# Patient Record
Sex: FEMALE | Race: BLACK OR AFRICAN AMERICAN | NOT HISPANIC OR LATINO | Employment: FULL TIME | RURAL
[De-identification: names, ages, dates, MRNs, and addresses within clinical notes are randomized per-mention and may not be internally consistent; named-entity substitution may affect disease eponyms.]

---

## 2020-06-26 ENCOUNTER — HISTORICAL (OUTPATIENT)
Dept: ADMINISTRATIVE | Facility: HOSPITAL | Age: 45
End: 2020-06-26

## 2020-06-26 LAB — SARS-COV+SARS-COV-2 AG RESP QL IA.RAPID: NEGATIVE

## 2022-05-18 ENCOUNTER — OFFICE VISIT (OUTPATIENT)
Dept: FAMILY MEDICINE | Facility: CLINIC | Age: 47
End: 2022-05-18
Payer: COMMERCIAL

## 2022-05-18 VITALS
DIASTOLIC BLOOD PRESSURE: 73 MMHG | BODY MASS INDEX: 21.5 KG/M2 | OXYGEN SATURATION: 99 % | WEIGHT: 137 LBS | SYSTOLIC BLOOD PRESSURE: 108 MMHG | HEIGHT: 67 IN | HEART RATE: 86 BPM | TEMPERATURE: 99 F | RESPIRATION RATE: 20 BRPM

## 2022-05-18 DIAGNOSIS — U07.1 COVID-19: Primary | ICD-10-CM

## 2022-05-18 DIAGNOSIS — Z20.822 COVID-19 VIRUS TEST RESULT UNKNOWN: ICD-10-CM

## 2022-05-18 DIAGNOSIS — R05.9 COUGH: ICD-10-CM

## 2022-05-18 DIAGNOSIS — J02.9 SORE THROAT: ICD-10-CM

## 2022-05-18 DIAGNOSIS — R51.9 NONINTRACTABLE HEADACHE, UNSPECIFIED CHRONICITY PATTERN, UNSPECIFIED HEADACHE TYPE: ICD-10-CM

## 2022-05-18 DIAGNOSIS — R52 BODY ACHES: ICD-10-CM

## 2022-05-18 LAB
CTP QC/QA: YES
FLUAV AG NPH QL: NEGATIVE
FLUBV AG NPH QL: NEGATIVE
SARS-COV-2 AG RESP QL IA.RAPID: POSITIVE

## 2022-05-18 PROCEDURE — 3074F PR MOST RECENT SYSTOLIC BLOOD PRESSURE < 130 MM HG: ICD-10-PCS | Mod: ,,, | Performed by: NURSE PRACTITIONER

## 2022-05-18 PROCEDURE — 3008F BODY MASS INDEX DOCD: CPT | Mod: ,,, | Performed by: NURSE PRACTITIONER

## 2022-05-18 PROCEDURE — 99203 OFFICE O/P NEW LOW 30 MIN: CPT | Mod: ,,, | Performed by: NURSE PRACTITIONER

## 2022-05-18 PROCEDURE — 99203 PR OFFICE/OUTPT VISIT, NEW, LEVL III, 30-44 MIN: ICD-10-PCS | Mod: ,,, | Performed by: NURSE PRACTITIONER

## 2022-05-18 PROCEDURE — 87428 SARSCOV & INF VIR A&B AG IA: CPT | Mod: QW,,, | Performed by: NURSE PRACTITIONER

## 2022-05-18 PROCEDURE — 3078F DIAST BP <80 MM HG: CPT | Mod: ,,, | Performed by: NURSE PRACTITIONER

## 2022-05-18 PROCEDURE — 3078F PR MOST RECENT DIASTOLIC BLOOD PRESSURE < 80 MM HG: ICD-10-PCS | Mod: ,,, | Performed by: NURSE PRACTITIONER

## 2022-05-18 PROCEDURE — 87428 POCT SARS-COV2 (COVID) WITH FLU ANTIGEN: ICD-10-PCS | Mod: QW,,, | Performed by: NURSE PRACTITIONER

## 2022-05-18 PROCEDURE — 3074F SYST BP LT 130 MM HG: CPT | Mod: ,,, | Performed by: NURSE PRACTITIONER

## 2022-05-18 PROCEDURE — 3008F PR BODY MASS INDEX (BMI) DOCUMENTED: ICD-10-PCS | Mod: ,,, | Performed by: NURSE PRACTITIONER

## 2022-05-18 RX ORDER — LORATADINE AND PSEUDOEPHEDRINE SULFATE 5; 120 MG/1; MG/1
1 TABLET, EXTENDED RELEASE ORAL 2 TIMES DAILY
COMMUNITY
Start: 2022-03-19 | End: 2022-05-18

## 2022-05-18 RX ORDER — BIMATOPROST 0.1 MG/ML
SOLUTION/ DROPS OPHTHALMIC
COMMUNITY
Start: 2021-12-22

## 2022-05-18 RX ORDER — TIMOLOL MALEATE 5 MG/ML
SOLUTION/ DROPS OPHTHALMIC
COMMUNITY
Start: 2022-02-16

## 2022-05-18 NOTE — PROGRESS NOTES
"[facility] - [facility]  Phone: [phone]       PATIENT NAME: Kallie Clarke   : 1975    AGE: 46 y.o. DATE: 2022    MRN: [id number]        Reason for Visit / Chief Complaint:  Headache, Generalized Body Aches, Sore Throat, Cough, and Fever     Subjective:     HPI:  46 yr old AAF presents to the office for ha, body aches, sore throat, cough and fever for 2 days  Denies any known exposure to covid     Review of Systems:    Pertinent items are above noted in HPI.    Review of patient's allergies indicates:   Allergen Reactions    Ketorolac tromethamine     Penicillin g      Other reaction(s): Dermatological problems, e.g., rash, hives        Med List:  Current Outpatient Medications on File Prior to Visit   Medication Sig Dispense Refill    LUMIGAN 0.01 % Drop SMARTSI Drop(s) In Eye(s) Every Evening      timolol maleate 0.5% (TIMOPTIC) 0.5 % Drop SMARTSI Drop(s) In Eye(s) Every Evening      CLARITIN-D 12 HOUR 5-120 mg per tablet Take 1 tablet by mouth 2 (two) times daily.       No current facility-administered medications on file prior to visit.       Medical/Social/Family History:  History reviewed. No pertinent past medical history.   Social History     Tobacco Use   Smoking Status Never Smoker   Smokeless Tobacco Never Used      Social History     Substance and Sexual Activity   Alcohol Use Never       History reviewed. No pertinent family history.   Past Surgical History:   Procedure Laterality Date    CHOLECYSTECTOMY      HYSTERECTOMY          Objective:      Vitals:    22 0818   BP: 108/73   BP Location: Right arm   Patient Position: Sitting   BP Method: Medium (Automatic)   Pulse: 86   Resp: 20   Temp: 98.9 °F (37.2 °C)  Comment: Patient took Tylenol this morning.   TempSrc: Oral   SpO2: 99%   Weight: 62.1 kg (137 lb)   Height: 5' 7" (1.702 m)     Body mass index is 21.46 kg/m².     Physical Exam:    General: well developed, well " nourished    Head: normocephalic, atraumatic    Eyes: conjunctivae/corneas clear. PERRL.    Mouth/ENT: postnasal drip notedmucous membranes moist, pharynx normal without lesions Uvula is midline.     Neck: supple, symmetrical, trachea midline, no adenopathy and thyroid: not enlarged, symmetric, no tenderness/mass/nodules    Respiratory: unlabored respirations, no intercostal retractions or accessory muscle use, clear to auscultation without rales or wheezes    Cardiovascular: normal rate and regular rhythm, S1 and S2 normal, no murmurs noted;    Abdomen: soft, nontender    Musculoskeletal: negative; full range of motion, no tenderness, palpable spasm or pain on motion    Lymphadenopathy: No cervical or supraclavicular adenopathy    Neurological: normal without focal findings and mental status, speech normal, alert and oriented x3    Skin: Skin color, texture, turgor normal. No rashes or lesions    Psych: no auditory or visual hallucinations, no anxiety, no depression/worrying alot       Assessment:          ICD-10-CM ICD-9-CM   1. COVID-19  U07.1 079.89   2. Nonintractable headache, unspecified chronicity pattern, unspecified headache type  R51.9 784.0   3. Body aches  R52 780.96   4. Sore throat  J02.9 462   5. Cough  R05.9 786.2   6. COVID-19 virus test result unknown  Z20.822 V01.79        Plan:       COVID-19  -     pyrilamine-chlophedianoL 12.5-12.5 mg/5 mL Liqd; Take 5 mLs by mouth every 8 (eight) hours as needed (cough).  Dispense: 240 mL; Refill: 0    Nonintractable headache, unspecified chronicity pattern, unspecified headache type  -     POCT SARS-COV2 (COVID) with Flu Antigen    Body aches  -     POCT SARS-COV2 (COVID) with Flu Antigen    Sore throat  -     POCT SARS-COV2 (COVID) with Flu Antigen    Cough  -     POCT SARS-COV2 (COVID) with Flu Antigen    COVID-19 virus test result unknown  -     POCT SARS-COV2 (COVID) with Flu Antigen        Current Outpatient Medications:     LUMIGAN 0.01 % Drop,  SMARTSI Drop(s) In Eye(s) Every Evening, Disp: , Rfl:     timolol maleate 0.5% (TIMOPTIC) 0.5 % Drop, SMARTSI Drop(s) In Eye(s) Every Evening, Disp: , Rfl:     CLARITIN-D 12 HOUR 5-120 mg per tablet, Take 1 tablet by mouth 2 (two) times daily., Disp: , Rfl:     pyrilamine-chlophedianoL 12.5-12.5 mg/5 mL Liqd, Take 5 mLs by mouth every 8 (eight) hours as needed (cough)., Disp: 240 mL, Rfl: 0        New & refilled meds:  Requested Prescriptions     Signed Prescriptions Disp Refills    pyrilamine-chlophedianoL 12.5-12.5 mg/5 mL Liqd 240 mL 0     Sig: Take 5 mLs by mouth every 8 (eight) hours as needed (cough).     Treatment Recommendations:    Orders and follow up as documented in patient record.  Rest, hydrate, quarantine x 10 days   Return to clinic as needed.    Signature: JEAN Harden    Agree with plan